# Patient Record
Sex: MALE | Race: BLACK OR AFRICAN AMERICAN | NOT HISPANIC OR LATINO | Employment: UNEMPLOYED | URBAN - METROPOLITAN AREA
[De-identification: names, ages, dates, MRNs, and addresses within clinical notes are randomized per-mention and may not be internally consistent; named-entity substitution may affect disease eponyms.]

---

## 2022-06-17 ENCOUNTER — APPOINTMENT (EMERGENCY)
Dept: RADIOLOGY | Facility: HOSPITAL | Age: 52
End: 2022-06-17

## 2022-06-17 ENCOUNTER — HOSPITAL ENCOUNTER (EMERGENCY)
Facility: HOSPITAL | Age: 52
Discharge: HOME | End: 2022-06-17
Attending: EMERGENCY MEDICINE

## 2022-06-17 VITALS
HEART RATE: 72 BPM | SYSTOLIC BLOOD PRESSURE: 146 MMHG | OXYGEN SATURATION: 98 % | RESPIRATION RATE: 23 BRPM | WEIGHT: 194 LBS | DIASTOLIC BLOOD PRESSURE: 100 MMHG | TEMPERATURE: 96.7 F

## 2022-06-17 DIAGNOSIS — K21.9 ACID REFLUX: ICD-10-CM

## 2022-06-17 DIAGNOSIS — I10 HYPERTENSION: ICD-10-CM

## 2022-06-17 DIAGNOSIS — E11.9 DIABETES MELLITUS (HCC): ICD-10-CM

## 2022-06-17 DIAGNOSIS — R07.9 CHEST PAIN, UNSPECIFIED TYPE: Primary | ICD-10-CM

## 2022-06-17 LAB
ALBUMIN SERPL BCP-MCNC: 4.7 G/DL (ref 3–5.2)
ALP SERPL-CCNC: 82 U/L (ref 43–122)
ALT SERPL W P-5'-P-CCNC: 27 U/L
ANION GAP SERPL CALCULATED.3IONS-SCNC: 7 MMOL/L (ref 5–14)
AST SERPL W P-5'-P-CCNC: 31 U/L (ref 17–59)
BASOPHILS # BLD AUTO: 0.03 THOUSANDS/ΜL (ref 0–0.1)
BASOPHILS NFR BLD AUTO: 1 % (ref 0–1)
BILIRUB SERPL-MCNC: 0.69 MG/DL
BUN SERPL-MCNC: 13 MG/DL (ref 5–25)
CALCIUM SERPL-MCNC: 9.7 MG/DL (ref 8.4–10.2)
CARDIAC TROPONIN I PNL SERPL HS: 4 NG/L
CHLORIDE SERPL-SCNC: 101 MMOL/L (ref 97–108)
CO2 SERPL-SCNC: 28 MMOL/L (ref 22–30)
CREAT SERPL-MCNC: 0.9 MG/DL (ref 0.7–1.5)
EOSINOPHIL # BLD AUTO: 0.02 THOUSAND/ΜL (ref 0–0.61)
EOSINOPHIL NFR BLD AUTO: 0 % (ref 0–6)
ERYTHROCYTE [DISTWIDTH] IN BLOOD BY AUTOMATED COUNT: 12.7 % (ref 11.6–15.1)
GFR SERPL CREATININE-BSD FRML MDRD: 99 ML/MIN/1.73SQ M
GLUCOSE SERPL-MCNC: 281 MG/DL (ref 70–99)
HCT VFR BLD AUTO: 43.7 % (ref 36.5–49.3)
HGB BLD-MCNC: 14 G/DL (ref 12–17)
IMM GRANULOCYTES # BLD AUTO: 0.01 THOUSAND/UL (ref 0–0.2)
IMM GRANULOCYTES NFR BLD AUTO: 0 % (ref 0–2)
LIPASE SERPL-CCNC: 353 U/L (ref 23–300)
LYMPHOCYTES # BLD AUTO: 2.83 THOUSANDS/ΜL (ref 0.6–4.47)
LYMPHOCYTES NFR BLD AUTO: 48 % (ref 14–44)
MCH RBC QN AUTO: 29.4 PG (ref 26.8–34.3)
MCHC RBC AUTO-ENTMCNC: 32 G/DL (ref 31.4–37.4)
MCV RBC AUTO: 92 FL (ref 82–98)
MONOCYTES # BLD AUTO: 0.32 THOUSAND/ΜL (ref 0.17–1.22)
MONOCYTES NFR BLD AUTO: 6 % (ref 4–12)
NEUTROPHILS # BLD AUTO: 2.6 THOUSANDS/ΜL (ref 1.85–7.62)
NEUTS SEG NFR BLD AUTO: 45 % (ref 43–75)
NRBC BLD AUTO-RTO: 0 /100 WBCS
PLATELET # BLD AUTO: 262 THOUSANDS/UL (ref 149–390)
PMV BLD AUTO: 9.5 FL (ref 8.9–12.7)
POTASSIUM SERPL-SCNC: 4.2 MMOL/L (ref 3.6–5)
PROT SERPL-MCNC: 8.8 G/DL (ref 5.9–8.4)
RBC # BLD AUTO: 4.76 MILLION/UL (ref 3.88–5.62)
SODIUM SERPL-SCNC: 136 MMOL/L (ref 137–147)
WBC # BLD AUTO: 5.81 THOUSAND/UL (ref 4.31–10.16)

## 2022-06-17 PROCEDURE — 93005 ELECTROCARDIOGRAM TRACING: CPT

## 2022-06-17 PROCEDURE — C9113 INJ PANTOPRAZOLE SODIUM, VIA: HCPCS | Performed by: EMERGENCY MEDICINE

## 2022-06-17 PROCEDURE — 36415 COLL VENOUS BLD VENIPUNCTURE: CPT | Performed by: EMERGENCY MEDICINE

## 2022-06-17 PROCEDURE — 99285 EMERGENCY DEPT VISIT HI MDM: CPT

## 2022-06-17 PROCEDURE — 83690 ASSAY OF LIPASE: CPT | Performed by: EMERGENCY MEDICINE

## 2022-06-17 PROCEDURE — 96374 THER/PROPH/DIAG INJ IV PUSH: CPT

## 2022-06-17 PROCEDURE — 84484 ASSAY OF TROPONIN QUANT: CPT | Performed by: EMERGENCY MEDICINE

## 2022-06-17 PROCEDURE — 71045 X-RAY EXAM CHEST 1 VIEW: CPT

## 2022-06-17 PROCEDURE — 85025 COMPLETE CBC W/AUTO DIFF WBC: CPT | Performed by: EMERGENCY MEDICINE

## 2022-06-17 PROCEDURE — 80053 COMPREHEN METABOLIC PANEL: CPT | Performed by: EMERGENCY MEDICINE

## 2022-06-17 PROCEDURE — 99285 EMERGENCY DEPT VISIT HI MDM: CPT | Performed by: EMERGENCY MEDICINE

## 2022-06-17 RX ORDER — OMEPRAZOLE 20 MG/1
20 CAPSULE, DELAYED RELEASE ORAL DAILY PRN
Qty: 30 CAPSULE | Refills: 0 | Status: SHIPPED | OUTPATIENT
Start: 2022-06-17 | End: 2022-07-17

## 2022-06-17 RX ORDER — SODIUM CHLORIDE 9 MG/ML
3 INJECTION INTRAVENOUS
Status: DISCONTINUED | OUTPATIENT
Start: 2022-06-17 | End: 2022-06-17 | Stop reason: HOSPADM

## 2022-06-17 RX ORDER — PANTOPRAZOLE SODIUM 40 MG/1
40 INJECTION, POWDER, FOR SOLUTION INTRAVENOUS ONCE
Status: COMPLETED | OUTPATIENT
Start: 2022-06-17 | End: 2022-06-17

## 2022-06-17 RX ADMIN — PANTOPRAZOLE SODIUM 40 MG: 40 INJECTION, POWDER, FOR SOLUTION INTRAVENOUS at 18:58

## 2022-06-17 NOTE — ED PROVIDER NOTES
History  Chief Complaint   Patient presents with    Chest Pain     Per friend translating in room "He has been having stomach pains/chest pains for months, long time, comes and goes, for past 3 months now "  denies N/V, shortness of breath " he can't sleep at night because of it "     82-year-old male with history of hypertension, not currently on medications, presents to the emergency department for chest pain and abdominal pain  Patient says he just moved here from [facility] and has not been able to establish care with the primary care physician locally at  He has had 3 months of intermittent chest pain  He says that the pain occurs when he lays down and radiates into his throat, right side of neck, and epigastrium  Has not tried any medications for this  Denies any pain at present  No leg pain or swelling, recent immobilization or surgery, hemoptysis, personal or family history of VTE  Denies fever, chills, cough, SOB, n/v/d, headache, any other complaints  Chest Pain  Pain location:  Substernal area and epigastric  Pain quality: sharp    Radiates to: throat  Pain radiates to the back: no    Pain severity:  No pain  Onset quality:  Gradual  Duration:  3 months  Timing:  Intermittent  Progression:  Unchanged  Chronicity:  Recurrent  Context: not breathing and no movement    Relieved by:  None tried  Worsened by:  Nothing tried  Associated symptoms: abdominal pain    Associated symptoms: no back pain, no cough, no fever, no headache, no nausea, no shortness of breath and not vomiting        None       Past Medical History:   Diagnosis Date    Hypertension        History reviewed  No pertinent surgical history  History reviewed  No pertinent family history  I have reviewed and agree with the history as documented      E-Cigarette/Vaping     E-Cigarette/Vaping Substances     Social History     Tobacco Use    Smoking status: Never Smoker    Smokeless tobacco: Never Used   Substance Use Topics    Alcohol use: Not Currently    Drug use: Not Currently       Review of Systems   Constitutional: Negative  Negative for chills and fever  HENT: Negative  Negative for congestion and rhinorrhea  Eyes: Negative  Respiratory: Negative  Negative for cough and shortness of breath  Cardiovascular: Positive for chest pain  Negative for leg swelling  Gastrointestinal: Positive for abdominal pain  Negative for abdominal distention, diarrhea, nausea and vomiting  Musculoskeletal: Negative  Negative for back pain  Skin: Negative  Negative for rash  Neurological: Negative  Negative for light-headedness and headaches  Hematological: Negative  All other systems reviewed and are negative  Physical Exam  Physical Exam  Vitals and nursing note reviewed  Constitutional:       General: He is not in acute distress  Appearance: He is well-developed  HENT:      Head: Normocephalic and atraumatic  Mouth/Throat:      Mouth: Mucous membranes are moist    Eyes:      Pupils: Pupils are equal, round, and reactive to light  Cardiovascular:      Rate and Rhythm: Normal rate and regular rhythm  Heart sounds: Normal heart sounds  No murmur heard  No friction rub  No gallop  Pulmonary:      Effort: Pulmonary effort is normal  No respiratory distress  Breath sounds: Normal breath sounds  No stridor  No decreased breath sounds, wheezing, rhonchi or rales  Abdominal:      Palpations: Abdomen is soft  Tenderness: There is no abdominal tenderness  There is no guarding or rebound  Musculoskeletal:         General: No swelling or tenderness  Normal range of motion  Cervical back: Normal range of motion and neck supple  Right lower leg: No edema  Left lower leg: No edema  Skin:     General: Skin is warm and dry  Capillary Refill: Capillary refill takes less than 2 seconds  Neurological:      Mental Status: He is alert and oriented to person, place, and time  Comments: Clear fluent speech         Vital Signs  ED Triage Vitals [06/17/22 1834]   Temperature Pulse Respirations Blood Pressure SpO2   (!) 96 7 °F (35 9 °C) 70 20 161/93 96 %      Temp Source Heart Rate Source Patient Position - Orthostatic VS BP Location FiO2 (%)   Oral Monitor Sitting Left arm --      Pain Score       --           Vitals:    06/17/22 1834 06/17/22 1930   BP: 161/93 146/100   Pulse: 70 72   Patient Position - Orthostatic VS: Sitting Lying         Visual Acuity      ED Medications  Medications   sodium chloride (PF) 0 9 % injection 3 mL (has no administration in time range)   pantoprazole (PROTONIX) injection 40 mg (40 mg Intravenous Given 6/17/22 1858)       Diagnostic Studies  Results Reviewed     Procedure Component Value Units Date/Time    HS Troponin 0hr (reflex protocol) [[id number]]  (Normal) Collected: 06/17/22 1847    Lab Status: Final result Specimen: Blood from Arm, Right Updated: 06/17/22 1918     hs TnI 0hr 4 ng/L     Lipase [[id number]]  (Abnormal) Collected: 06/17/22 1847    Lab Status: Final result Specimen: Blood from Arm, Right Updated: 06/17/22 1908     Lipase 353 u/L     Comprehensive metabolic panel [[id number]]  (Abnormal) Collected: 06/17/22 1847    Lab Status: Final result Specimen: Blood from Arm, Right Updated: 06/17/22 1908     Sodium 136 mmol/L      Potassium 4 2 mmol/L      Chloride 101 mmol/L      CO2 28 mmol/L      ANION GAP 7 mmol/L      BUN 13 mg/dL      Creatinine 0 90 mg/dL      Glucose 281 mg/dL      Calcium 9 7 mg/dL      AST 31 U/L      ALT 27 U/L      Alkaline Phosphatase 82 U/L      Total Protein 8 8 g/dL      Albumin 4 7 g/dL      Total Bilirubin 0 69 mg/dL      eGFR 99 ml/min/1 73sq m     Narrative:      Landy guidelines for Chronic Kidney Disease (CKD):     Stage 1 with normal or high GFR (GFR > 90 mL/min/1 73 square meters)    Stage 2 Mild CKD (GFR = 60-89 mL/min/1 73 square meters)    Stage 3A Moderate CKD (GFR = 45-59 mL/min/1 73 square meters)    Stage 3B Moderate CKD (GFR = 30-44 mL/min/1 73 square meters)    Stage 4 Severe CKD (GFR = 15-29 mL/min/1 73 square meters)    Stage 5 End Stage CKD (GFR <15 mL/min/1 73 square meters)  Note: GFR calculation is accurate only with a steady state creatinine    CBC and differential [[id number]]  (Abnormal) Collected: 06/17/22 1847    Lab Status: Final result Specimen: Blood from Arm, Right Updated: 06/17/22 1855     WBC 5 81 Thousand/uL      RBC 4 76 Million/uL      Hemoglobin 14 0 g/dL      Hematocrit 43 7 %      MCV 92 fL      MCH 29 4 pg      MCHC 32 0 g/dL      RDW 12 7 %      MPV 9 5 fL      Platelets 786 Thousands/uL      nRBC 0 /100 WBCs      Neutrophils Relative 45 %      Immat GRANS % 0 %      Lymphocytes Relative 48 %      Monocytes Relative 6 %      Eosinophils Relative 0 %      Basophils Relative 1 %      Neutrophils Absolute 2 60 Thousands/µL      Immature Grans Absolute 0 01 Thousand/uL      Lymphocytes Absolute 2 83 Thousands/µL      Monocytes Absolute 0 32 Thousand/µL      Eosinophils Absolute 0 02 Thousand/µL      Basophils Absolute 0 03 Thousands/µL                  X-ray chest 1 view portable   ED Interpretation by Emy Adan MD (06/17 1922)   No acute cardiopulmonary disease                 Procedures  Procedures         ED Course                               SBIRT 20yo+    Flowsheet Row Most Recent Value   SBIRT (25 yo +)    In order to provide better care to our patients, we are screening all of our patients for alcohol and drug use  Would it be okay to ask you these screening questions? No Filed at: 06/17/2022 1844                    MDM  Number of Diagnoses or Management Options  Acid reflux  Chest pain, unspecified type  Diabetes mellitus ([facility] [address] )  Hypertension  Diagnosis management comments: 46-year-old male with history of hypertension, not currently on medications, presents to the emergency department for chest pain and abdominal pain    Patient's pain is intermittent and he has no pain at present  More concerned when he lies down at night  The history suspicious is for reflux  Within the differential diagnosis also consider ACS, musculoskeletal, pneumonia, pneumothorax, viral illness  Low concern for PE or dissection based on history and being symptom free at present  Final assessment:  Patient remains asymptomatic throughout ED course  His initial cardiac workup was reassuring  Given that chest pain has been intermittent for the past few months and he is asymptomatic at present, initial troponin is <5, do not feel need to repeat troponin  He does have glucose of 280 consistent with new diagnosis of diabetes  Will start on metformin  Discussed that his chest pain may be related to reflux  When discussing this patient says that he does often get a reflux sensation in his throat when he gets the chest pain  Will send home with a prescription for omeprazole  Explained that he must follow up with his primary care physician and make an appointment with cardiology for further evaluation  Strict ED return precautions provided should symptoms worsen and patient can otherwise follow up outpatient  Patient expresses an understanding and agreement with the plan and remains in good condition for discharge         Disposition  Final diagnoses:   Chest pain, unspecified type   Diabetes mellitus ([place] [address] )   Hypertension   Acid reflux     Time reflects when diagnosis was documented in both MDM as applicable and the Disposition within this note     Time User Action Codes Description Comment    6/17/2022  6:43 PM Minor, Pat Add [R07 9] Chest pain, unspecified type     6/17/2022  7:27 PM Minor, Pat Add [E11 9] Diabetes mellitus ([facility] )     6/17/2022  7:28 PM Minor, Pat Add [I10] Hypertension     6/17/2022  7:29 PM Minor, Pat Add [K21 9] Acid reflux       ED Disposition     ED Disposition   Discharge    Condition   Stable    Date/Time   Fri Jun 17, 2022  7:30 PM Comment   Margot White discharge to home/self care  Follow-up Information     Follow up With Specialties Details Why Contact Info Additional [address] [facility] Emergency Department Emergency Medicine Go to  If symptoms worsen [address] [address]  [address] Family Medicine Call in 1 day  [address], [address] [address]  [address], [address], [address] [place], [place], [address]    [address] [facility] Cardiology Call in 1 day  [facility] [address]  [address], [address], [place], [place], [address] [phone]          Discharge Medication List as of 6/17/2022  7:32 PM      START taking these medications    Details   metFORMIN (GLUCOPHAGE) 500 mg tablet Take 1 tablet (500 mg total) by mouth 2 (two) times a day with meals, Starting Fri 6/17/2022, Until Sun 7/17/2022, Normal      omeprazole (PriLOSEC) 20 mg delayed release capsule Take 1 capsule (20 mg total) by mouth daily as needed (reflux), Starting Fri 6/17/2022, Until Sun 7/17/2022 at 2359, Normal             No discharge procedures on file      PDMP Review     None          ED Provider  Electronically Signed by           Serg Johnson MD  06/17/22 1123

## 2022-06-19 LAB
ATRIAL RATE: 73 BPM
P AXIS: 50 DEGREES
PR INTERVAL: 150 MS
QRS AXIS: -20 DEGREES
QRSD INTERVAL: 96 MS
QT INTERVAL: 386 MS
QTC INTERVAL: 425 MS
T WAVE AXIS: 87 DEGREES
VENTRICULAR RATE: 73 BPM

## 2022-06-19 PROCEDURE — 93010 ELECTROCARDIOGRAM REPORT: CPT | Performed by: INTERNAL MEDICINE
